# Patient Record
Sex: MALE | NOT HISPANIC OR LATINO | URBAN - METROPOLITAN AREA
[De-identification: names, ages, dates, MRNs, and addresses within clinical notes are randomized per-mention and may not be internally consistent; named-entity substitution may affect disease eponyms.]

---

## 2018-03-27 ENCOUNTER — IMPORTED ENCOUNTER (OUTPATIENT)
Dept: URBAN - METROPOLITAN AREA CLINIC 1 | Facility: CLINIC | Age: 48
End: 2018-03-27

## 2018-03-27 PROBLEM — H52.13: Noted: 2018-03-27

## 2018-03-27 PROBLEM — H52.4: Noted: 2018-03-27

## 2018-03-27 PROCEDURE — S0621 ROUTINE OPHTHALMOLOGICAL EXA: HCPCS

## 2018-03-27 NOTE — PATIENT DISCUSSION
1. Myopia: Rx was given for correction if indicated and requested. Did not get Rx filled last time2. Presbyopia: Rx was given for corrective spectacles if indicated. 3.  Return for an appointment in 1 year for 30 with Dr. Denis Felton.

## 2019-04-02 ENCOUNTER — IMPORTED ENCOUNTER (OUTPATIENT)
Dept: URBAN - METROPOLITAN AREA CLINIC 1 | Facility: CLINIC | Age: 49
End: 2019-04-02

## 2019-04-02 PROBLEM — H52.13: Noted: 2019-04-02

## 2019-04-02 PROBLEM — H52.4: Noted: 2019-04-02

## 2019-04-02 PROCEDURE — S0621 ROUTINE OPHTHALMOLOGICAL EXA: HCPCS

## 2019-04-02 NOTE — PATIENT DISCUSSION
1. Myopia: Rx was given for correction if indicated and requested. 2. Presbyopia: Rx was given for corrective spectacles if indicated. 3.  Return for an appointment in 1 year for 30. with Dr. Angela Trinidad.

## 2020-08-05 ENCOUNTER — IMPORTED ENCOUNTER (OUTPATIENT)
Dept: URBAN - METROPOLITAN AREA CLINIC 1 | Facility: CLINIC | Age: 50
End: 2020-08-05

## 2020-08-05 PROBLEM — H52.13: Noted: 2020-08-05

## 2020-08-05 PROBLEM — H52.4: Noted: 2020-08-05

## 2020-08-05 PROCEDURE — S0621 ROUTINE OPHTHALMOLOGICAL EXA: HCPCS

## 2020-08-05 NOTE — PATIENT DISCUSSION
1. Myopia: Rx was given for correction if indicated and requested. 2. Presbyopia 3. RADHA w/ PEK OU. Recommend AT's BID OU. Sample Blink given4. NS Cataract: ObserveReturn for an appointment in 1 year 36 with Dr. Kyrie Blanca.

## 2021-08-05 ENCOUNTER — IMPORTED ENCOUNTER (OUTPATIENT)
Dept: URBAN - METROPOLITAN AREA CLINIC 1 | Facility: CLINIC | Age: 51
End: 2021-08-05

## 2021-08-05 PROBLEM — H52.4: Noted: 2021-08-05

## 2021-08-05 PROBLEM — H52.13: Noted: 2021-08-05

## 2021-08-05 PROCEDURE — S0621 ROUTINE OPHTHALMOLOGICAL EXA: HCPCS

## 2022-04-02 ASSESSMENT — VISUAL ACUITY
OS_CC: J2
OS_SC: 20/30
OS_SC: 20/20
OS_SC: J1
OD_SC: 20/20
OD_SC: 20/20-2
OD_CC: J2
OS_SC: 20/20
OS_CC: J2
OD_SC: 20/20
OD_SC: 20/20
OS_SC: 20/20
OS_CC: J2
OD_CC: J2
OD_SC: J1
OD_CC: J2

## 2022-04-02 ASSESSMENT — TONOMETRY
OS_IOP_MMHG: 13
OS_IOP_MMHG: 14
OS_IOP_MMHG: 14
OD_IOP_MMHG: 14
OD_IOP_MMHG: 15
OD_IOP_MMHG: 14
OD_IOP_MMHG: 13
OS_IOP_MMHG: 14

## 2022-08-12 ENCOUNTER — ESTABLISHED PATIENT (OUTPATIENT)
Dept: URBAN - METROPOLITAN AREA CLINIC 1 | Facility: CLINIC | Age: 52
End: 2022-08-12

## 2022-08-12 DIAGNOSIS — H52.4: ICD-10-CM

## 2022-08-12 DIAGNOSIS — H52.13: ICD-10-CM

## 2022-08-12 PROCEDURE — 92014 COMPRE OPH EXAM EST PT 1/>: CPT

## 2022-08-12 PROCEDURE — 92015 DETERMINE REFRACTIVE STATE: CPT

## 2022-08-12 ASSESSMENT — VISUAL ACUITY
OS_CC: 20/20-2
OD_CC: J2
OS_CC: J2
OD_CC: 20/20-2

## 2022-08-12 ASSESSMENT — TONOMETRY
OS_IOP_MMHG: 14
OD_IOP_MMHG: 14

## 2023-08-18 ENCOUNTER — COMPREHENSIVE EXAM (OUTPATIENT)
Dept: URBAN - METROPOLITAN AREA CLINIC 1 | Facility: CLINIC | Age: 53
End: 2023-08-18

## 2023-08-18 DIAGNOSIS — H52.13: ICD-10-CM

## 2023-08-18 DIAGNOSIS — H52.4: ICD-10-CM

## 2023-08-18 PROCEDURE — 92014 COMPRE OPH EXAM EST PT 1/>: CPT

## 2023-08-18 PROCEDURE — 92015 DETERMINE REFRACTIVE STATE: CPT

## 2023-08-18 ASSESSMENT — VISUAL ACUITY
OS_SC: J1
OD_SC: J1
OD_CC: 20/20
OS_CC: 20/20-2

## 2023-08-18 ASSESSMENT — TONOMETRY
OD_IOP_MMHG: 10
OS_IOP_MMHG: 10

## 2024-08-23 ENCOUNTER — COMPREHENSIVE EXAM (OUTPATIENT)
Dept: URBAN - METROPOLITAN AREA CLINIC 1 | Facility: CLINIC | Age: 54
End: 2024-08-23

## 2024-08-23 DIAGNOSIS — H52.4: ICD-10-CM

## 2024-08-23 DIAGNOSIS — H52.221: ICD-10-CM

## 2024-08-23 DIAGNOSIS — Z01.00: ICD-10-CM

## 2024-08-23 DIAGNOSIS — H52.13: ICD-10-CM

## 2024-08-23 PROCEDURE — 92014 COMPRE OPH EXAM EST PT 1/>: CPT

## 2024-08-23 PROCEDURE — 92015 DETERMINE REFRACTIVE STATE: CPT

## 2024-08-23 ASSESSMENT — TONOMETRY
OD_IOP_MMHG: 13
OS_IOP_MMHG: 13

## 2024-08-23 ASSESSMENT — VISUAL ACUITY
OD_CC: J1+
OS_CC: J1+
OS_CC: 20/20-2
OD_CC: 20/20-1

## 2025-07-21 ENCOUNTER — HOSPITAL ENCOUNTER (OUTPATIENT)
Facility: HOSPITAL | Age: 55
Discharge: HOME | End: 2025-07-24
Attending: STUDENT IN AN ORGANIZED HEALTH CARE EDUCATION/TRAINING PROGRAM
Payer: COMMERCIAL

## 2025-07-21 DIAGNOSIS — N20.0 KIDNEY STONES: ICD-10-CM

## 2025-07-21 PROCEDURE — 76770 US EXAM ABDO BACK WALL COMP: CPT

## 2025-08-26 ENCOUNTER — COMPREHENSIVE EXAM (OUTPATIENT)
Age: 55
End: 2025-08-26

## 2025-08-26 DIAGNOSIS — Z01.00: ICD-10-CM

## 2025-08-26 DIAGNOSIS — H52.4: ICD-10-CM

## 2025-08-26 DIAGNOSIS — H52.13: ICD-10-CM

## 2025-08-26 DIAGNOSIS — H52.221: ICD-10-CM

## 2025-08-26 PROCEDURE — 92014 COMPRE OPH EXAM EST PT 1/>: CPT

## 2025-08-26 PROCEDURE — 92015 DETERMINE REFRACTIVE STATE: CPT
